# Patient Record
Sex: MALE | Race: WHITE | NOT HISPANIC OR LATINO | Employment: FULL TIME | URBAN - METROPOLITAN AREA
[De-identification: names, ages, dates, MRNs, and addresses within clinical notes are randomized per-mention and may not be internally consistent; named-entity substitution may affect disease eponyms.]

---

## 2018-04-03 ENCOUNTER — OFFICE VISIT (OUTPATIENT)
Dept: FAMILY MEDICINE CLINIC | Facility: CLINIC | Age: 33
End: 2018-04-03
Payer: COMMERCIAL

## 2018-04-03 VITALS
SYSTOLIC BLOOD PRESSURE: 120 MMHG | HEIGHT: 65 IN | BODY MASS INDEX: 28.66 KG/M2 | WEIGHT: 172 LBS | HEART RATE: 52 BPM | DIASTOLIC BLOOD PRESSURE: 72 MMHG

## 2018-04-03 DIAGNOSIS — L23.9 ALLERGIC CONTACT DERMATITIS, UNSPECIFIED TRIGGER: Primary | ICD-10-CM

## 2018-04-03 PROCEDURE — 1036F TOBACCO NON-USER: CPT | Performed by: FAMILY MEDICINE

## 2018-04-03 PROCEDURE — 3008F BODY MASS INDEX DOCD: CPT | Performed by: FAMILY MEDICINE

## 2018-04-03 PROCEDURE — 99203 OFFICE O/P NEW LOW 30 MIN: CPT | Performed by: FAMILY MEDICINE

## 2018-04-03 RX ORDER — PREDNISONE 10 MG/1
TABLET ORAL
Qty: 36 TABLET | Refills: 0 | Status: SHIPPED | OUTPATIENT
Start: 2018-04-03

## 2018-04-03 NOTE — PROGRESS NOTES
Assessment/Plan:    Allergic contact dermatitis  The patient was given a prednisone 10 mg tablets:  Five tablets for 2 days, 4 tablets for 3 days, 3 tablets for 3 days, 2 tablets for 2 days and 1 tablet for 1 day  He is also to use Claritin 10 mg 1 daily and Benadryl 25 mg 2 capsules at bedtime  Diagnoses and all orders for this visit:    Allergic contact dermatitis, unspecified trigger  -     predniSONE 10 mg tablet; Prednisone 10 mg tabs: Five tablets for 2 days, 4 tablets for 3 days, 3 tablets for 3 days, 2 tablets for 2 days, 1 tablet for 1 day  Subjective:      Patient ID: Nelwalan Chacon is a 34 y o  male  CC:  NP   -   Patient has itchy rash B/L forearms, abdomen, thighs that started Saturday  Notes 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 and questions if he was exposed to mold  He is currently using  OTC benadryl, hydrocortisone, and histaminum hydrochloricum   -  lsh    HPI:  This is a 59-year-old male new patient who comes in with a rash on his forearms, abdomen and thighs  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 and had noticed that he had mold exposed in the beam says that he was opening up  This has happened in the past and he has gotten the same type of rash  He has been using over-the-counter Benadryl, hydrocortisone cream and an antihistamine that he got over-the-counter to take during the day  His blood pressure is 120/72 and his weight is 172 lb  The following portions of the patient's history were reviewed and updated as appropriate: allergies, current medications, past family history, past medical history, past social history, past surgical history and problem list     Review of Systems   Constitutional: Negative  HENT: Negative  Eyes: Negative  Respiratory: Negative  Cardiovascular: Negative  Gastrointestinal: Negative  Endocrine: Negative  Genitourinary: Negative  Musculoskeletal: Negative  Skin: Positive for rash  Allergic/Immunologic: Negative  Neurological: Negative  Hematological: Negative  Psychiatric/Behavioral: Negative  Vitals:    04/03/18 0929   BP: 120/72   BP Location: Left arm   Patient Position: Sitting   Cuff Size: Large   Pulse: (!) 52   Weight: 78 kg (172 lb)   Height: 5' 5" (1 651 m)   Objective:      /72 (BP Location: Left arm, Patient Position: Sitting, Cuff Size: Large)   Pulse (!) 52   Ht 5' 5" (1 651 m)   Wt 78 kg (172 lb)   BMI 28 62 kg/m²          Physical Exam   Constitutional: He is oriented to person, place, and time  He appears well-developed and well-nourished  HENT:   Head: Normocephalic  Right Ear: External ear normal    Left Ear: External ear normal    Mouth/Throat: Oropharynx is clear and moist    Eyes: Conjunctivae and EOM are normal  Pupils are equal, round, and reactive to light  Neck: Normal range of motion  Neck supple  Cardiovascular: Normal rate, regular rhythm and normal heart sounds  Pulmonary/Chest: Effort normal and breath sounds normal    Abdominal: Soft  Bowel sounds are normal    Musculoskeletal: Normal range of motion  Neurological: He is alert and oriented to person, place, and time  Skin: Skin is warm  Papular, erythematous rash located on his forearms, abdomen and thighs  Psychiatric: He has a normal mood and affect   His behavior is normal  Judgment and thought content normal

## 2018-04-03 NOTE — ASSESSMENT & PLAN NOTE
The patient was given a prednisone 10 mg tablets:  Five tablets for 2 days, 4 tablets for 3 days, 3 tablets for 3 days, 2 tablets for 2 days and 1 tablet for 1 day  He is also to use Claritin 10 mg 1 daily and Benadryl 25 mg 2 capsules at bedtime

## 2019-10-25 ENCOUNTER — OFFICE VISIT (OUTPATIENT)
Dept: FAMILY MEDICINE CLINIC | Facility: CLINIC | Age: 34
End: 2019-10-25
Payer: COMMERCIAL

## 2019-10-25 VITALS
BODY MASS INDEX: 27.32 KG/M2 | DIASTOLIC BLOOD PRESSURE: 92 MMHG | SYSTOLIC BLOOD PRESSURE: 148 MMHG | HEIGHT: 65 IN | TEMPERATURE: 98.5 F | RESPIRATION RATE: 18 BRPM | HEART RATE: 80 BPM | WEIGHT: 164 LBS
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
## 2019-10-25 DIAGNOSIS — R21 RASH AND NONSPECIFIC SKIN ERUPTION: Primary | ICD-10-CM

## 2019-10-25 PROCEDURE — 3008F BODY MASS INDEX DOCD: CPT | Performed by: FAMILY MEDICINE

## 2019-10-25 PROCEDURE — 99213 OFFICE O/P EST LOW 20 MIN: CPT | Performed by: FAMILY MEDICINE

## 2019-10-25 RX ORDER — DOXYCYCLINE HYCLATE 100 MG
100 TABLET ORAL 2 TIMES DAILY
Qty: 42 TABLET | Refills: 0 | Status: SHIPPED | OUTPATIENT
Start: 2019-10-25 | End: 2019-11-15

## 2019-10-25 NOTE — ASSESSMENT & PLAN NOTE
Patient appears to have an active tick bite  Based on his history, it does seem like this is likely to be a deer tick and transmit Lyme disease for him  Will send doxycycline to the pharmacy for 21 days

## 2019-10-25 NOTE — PATIENT INSTRUCTIONS
Problem List Items Addressed This Visit     Tick bite - Primary    Relevant Medications    doxycycline hyclate (VIBRA-TABS) 100 mg tablet

## 2019-10-25 NOTE — PROGRESS NOTES
Assessment and Plan:    Problem List Items Addressed This Visit     Tick bite     Patient appears to have an active tick bite  Based on his history, it does seem like this is likely to be a deer tick and transmit Lyme disease for him  Will send doxycycline to the pharmacy for 21 days  Relevant Medications    doxycycline hyclate (VIBRA-TABS) 100 mg tablet      Other Visit Diagnoses     Rash and nonspecific skin eruption    -  Primary                 Diagnoses and all orders for this visit:    Rash and nonspecific skin eruption    Tick bite, initial encounter  -     doxycycline hyclate (VIBRA-TABS) 100 mg tablet; Take 1 tablet (100 mg total) by mouth 2 (two) times a day for 21 days              Subjective:      Patient ID: Delvis Haddad is a 27 y o  male  CC:    Chief Complaint   Patient presents with    Tick bite     Patient present today for a deer tick bite  Per patient he noticed it on Sunday  Anjana Scott stated he was diagnosed with Lyme when he was 22 yrs old and he was treated for it  HPI:    Patient recently removed a tick from his body  It was a deer tick per his description, and was imbedded in the skin  He did not use the device to remove the tick  Previously, he had Lyme disease, and wanted to be checked for that today  Reviewed with him about Lyme testing, as well as treatment options  The following portions of the patient's history were reviewed and updated as appropriate: allergies, current medications and problem list       Review of Systems   Constitutional: Negative  HENT: Negative  Skin: Positive for rash  Data to review:       Objective:    Vitals:    10/25/19 1406   BP: 148/92   BP Location: Left arm   Patient Position: Sitting   Cuff Size: Standard   Pulse: 80   Resp: 18   Temp: 98 5 °F (36 9 °C)   TempSrc: Temporal   Weight: 74 4 kg (164 lb)   Height: 5' 5" (1 651 m)        Physical Exam   Constitutional: He appears well-developed and well-nourished  Nursing note and vitals reviewed
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
## 2019-11-14 RX ORDER — DOXYCYCLINE HYCLATE 100 MG
100 TABLET ORAL 2 TIMES DAILY
Qty: 42 TABLET | Refills: 0 | OUTPATIENT
Start: 2019-11-14 | End: 2019-12-05

## 2022-08-16 ENCOUNTER — TELEMEDICINE (OUTPATIENT)
Dept: FAMILY MEDICINE CLINIC | Facility: CLINIC | Age: 37
End: 2022-08-16
Payer: COMMERCIAL

## 2022-08-16 ENCOUNTER — APPOINTMENT (OUTPATIENT)
Dept: RADIOLOGY | Facility: MEDICAL CENTER | Age: 37
End: 2022-08-16
Payer: COMMERCIAL

## 2022-08-16 DIAGNOSIS — R07.89 CHEST TIGHTNESS: ICD-10-CM

## 2022-08-16 DIAGNOSIS — J02.9 SORE THROAT: ICD-10-CM

## 2022-08-16 DIAGNOSIS — R05.9 COUGH: ICD-10-CM

## 2022-08-16 DIAGNOSIS — R50.9 FEVER, UNSPECIFIED FEVER CAUSE: Primary | ICD-10-CM

## 2022-08-16 DIAGNOSIS — R50.9 FEVER, UNSPECIFIED FEVER CAUSE: ICD-10-CM

## 2022-08-16 PROCEDURE — U0003 INFECTIOUS AGENT DETECTION BY NUCLEIC ACID (DNA OR RNA); SEVERE ACUTE RESPIRATORY SYNDROME CORONAVIRUS 2 (SARS-COV-2) (CORONAVIRUS DISEASE [COVID-19]), AMPLIFIED PROBE TECHNIQUE, MAKING USE OF HIGH THROUGHPUT TECHNOLOGIES AS DESCRIBED BY CMS-2020-01-R: HCPCS | Performed by: FAMILY MEDICINE

## 2022-08-16 PROCEDURE — 99442 PR PHYS/QHP TELEPHONE EVALUATION 11-20 MIN: CPT | Performed by: FAMILY MEDICINE

## 2022-08-16 PROCEDURE — U0005 INFEC AGEN DETEC AMPLI PROBE: HCPCS | Performed by: FAMILY MEDICINE

## 2022-08-16 PROCEDURE — 71046 X-RAY EXAM CHEST 2 VIEWS: CPT

## 2022-08-16 RX ORDER — AZITHROMYCIN 500 MG/1
TABLET, FILM COATED ORAL
COMMUNITY
Start: 2022-07-08 | End: 2022-08-18

## 2022-08-16 RX ORDER — DEXTROMETHORPHAN HYDROBROMIDE AND PROMETHAZINE HYDROCHLORIDE 15; 6.25 MG/5ML; MG/5ML
SYRUP ORAL
COMMUNITY
Start: 2022-08-15

## 2022-08-16 RX ORDER — ALBUTEROL SULFATE 90 UG/1
2 AEROSOL, METERED RESPIRATORY (INHALATION) EVERY 6 HOURS PRN
Qty: 8.5 G | Refills: 0 | Status: SHIPPED | OUTPATIENT
Start: 2022-08-16

## 2022-08-16 NOTE — PATIENT INSTRUCTIONS
Finish Zithromax, prednisone, and cough medicine as prescribed by urgent care   Complete chest x-ray  Come to office parking lot call office and we will complete PCR test for COVID   Use albuterol 2 puffs every 6 hours as needed for chest tightness cough or congestion  Recheck 2 days in office unless COVID positive

## 2022-08-16 NOTE — PROGRESS NOTES
COVID-19 Outpatient Progress Note    Assessment/Plan:    1  Fever  2  Chest tightness  3  Cough  4  Sore throat  Per patient urgent care did a rapid strep which was negative   Patient did 3 home tests since Saturday which were negative for COVID  Patient is continue Zithromax, prednisone, and cough medicine as prescribed by urgent care  Patient is come to office for COVID PCR test  Chest x-rays ordered  Albuterol inhaler is ordered  5  Recheck 48 hours in office unless COVID positive      Problem List Items Addressed This Visit    None     Visit Diagnoses     Fever, unspecified fever cause    -  Primary    Relevant Orders    COVID Only - Office Collect    XR chest pa & lateral    Chest tightness        Relevant Medications    albuterol (PROVENTIL HFA,VENTOLIN HFA) 90 mcg/act inhaler    Other Relevant Orders    COVID Only - Office Collect    XR chest pa & lateral    Cough        Relevant Orders    COVID Only - Office Collect    XR chest pa & lateral    Sore throat        Relevant Orders    XR chest pa & lateral         Disposition:     Referred patient to centralized site to test for COVID-19  Chest x-ray is ordered, albuterol is ordered  Patient should finish Zithromax, prednisone and use cough medicine as prescribed by urgent care  Patient to come to office to get PCR for COVID  Will recheck 2 days in office    I have spent 15 minutes directly with the patient  Encounter provider Benjamin Dooley DO    Provider located at [address] [address] [phone]    Recent Visits  No visits were found meeting these conditions    Showing recent visits within past 7 days and meeting all other requirements  Today's Visits  Date Type Provider Dept   08/16/22 Telemedicine Benjamin Dooley DO [facility]   Showing today's visits and meeting all other requirements  Future Appointments  No visits were found meeting these conditions  Showing future appointments within next 150 days and meeting all other requirements       The patient was identified by name and date of birth  Otoniel Prescott was informed that this is a telemedicine visit and that the visit is being conducted through Telephone  My office door was closed  No one else was in the room  He acknowledged consent and understanding of privacy and security of the video platform  The patient has agreed to participate and understands they can discontinue the visit at any time  Patient is aware this is a billable service  This virtual check-in was done via telephone and he agrees to proceed  Patient agrees to participate in a virtual check in via telephone or video visit instead of presenting to the office to address urgent/immediate medical needs  Patient is aware this is a billable service  After connecting through Telephone, the patient was identified by name and date of birth  Otoniel Prescott was informed that this was a telemedicine visit and that the exam was being conducted confidentially over secure lines  My office door was closed  No one else was in the room  Otoniel Prescott acknowledged consent and understanding of privacy and security of the telemedicine visit  I informed the patient that I have reviewed his record in Epic and presented the opportunity for him to ask any questions regarding the visit today  The patient agreed to participate  It was my intent to perform this visit via video technology but the patient was not able to do a video connection so the visit was completed via audio telephone only  Verification of patient location:  Patient is located in the following state in which I hold an active license: [place]    Subjective:   Otoniel Prescott is a 39 y o  male who is concerned about COVID-19  Patient's symptoms include fever, chills, sore throat, cough and chest tightness   Patient denies fatigue, malaise, congestion, rhinorrhea, anosmia, loss of taste, shortness of breath, abdominal pain, nausea, vomiting, diarrhea, myalgias and headaches  - Date of symptom onset: 8/13/2022      COVID-19 vaccination status: Not vaccinated    Exposure:   Contact with a person who is under investigation (PUI) for or who is positive for COVID-19 within the last 14 days?: No    Hospitalized recently for fever and/or lower respiratory symptoms?: No      Currently a healthcare worker that is involved in direct patient care?: No      Works in a special setting where the risk of COVID-19 transmission may be high? (this may include long-term care, correctional and halfway facilities; homeless shelters; assisted-living facilities and group homes ): No      Resident in a special setting where the risk of COVID-19 transmission may be high? (this may include long-term care, correctional and halfway facilities; homeless shelters; assisted-living facilities and group homes ): No      Patient was in [facility] for 10 days came back last Wednesday  On Saturday patient was out my in biking and felt some chest tightness has had low-grade fever off and on since that time  Had a sore throat  Went to urgent care yesterday received Z-Kg, prednisone and cough medicine  Patient did 3 home test which were negative for COVID  Urgent care did a strep test which was negative yesterday  No results found for: Rioschinyere Almonte, [address] [facility] [address], [place] [address], [facility], [address] [facility]  Past Medical History:   Diagnosis Date    Mass     Cyst on right shoulder     History reviewed  No pertinent surgical history    Current Outpatient Medications   Medication Sig Dispense Refill    albuterol (PROVENTIL HFA,VENTOLIN HFA) 90 mcg/act inhaler Inhale 2 puffs every 6 (six) hours as needed (Chest tightness) 8 5 g 0    azithromycin (ZITHROMAX) 500 MG tablet TAKE 2 TABLETS BY MOUTH ONCE      promethazine-dextromethorphan (PHENERGAN-DM) 6 25-15 mg/5 mL oral syrup        No current facility-administered medications for this visit  Allergies   Allergen Reactions    Clove [Syzygium Aromaticum] Vomiting       Review of Systems   Constitutional: Positive for chills and fever  Negative for fatigue  HENT: Positive for sore throat  Negative for congestion and rhinorrhea  Respiratory: Positive for cough and chest tightness  Negative for shortness of breath  Gastrointestinal: Negative for abdominal pain, diarrhea, nausea and vomiting  Musculoskeletal: Negative for myalgias  Neurological: Negative for headaches  Objective: There were no vitals filed for this visit      Physical Exam  Constitutional:       Comments: Phone call, no exam

## 2022-08-17 LAB — SARS-COV-2 RNA RESP QL NAA+PROBE: NEGATIVE

## 2022-08-18 ENCOUNTER — OFFICE VISIT (OUTPATIENT)
Dept: FAMILY MEDICINE CLINIC | Facility: CLINIC | Age: 37
End: 2022-08-18
Payer: COMMERCIAL

## 2022-08-18 VITALS
TEMPERATURE: 97.6 F | OXYGEN SATURATION: 99 % | WEIGHT: 163.38 LBS | HEIGHT: 66 IN | BODY MASS INDEX: 26.26 KG/M2 | SYSTOLIC BLOOD PRESSURE: 112 MMHG | DIASTOLIC BLOOD PRESSURE: 72 MMHG | HEART RATE: 74 BPM

## 2022-08-18 DIAGNOSIS — R09.89 CHEST CONGESTION: ICD-10-CM

## 2022-08-18 DIAGNOSIS — Z11.59 NEED FOR HEPATITIS C SCREENING TEST: ICD-10-CM

## 2022-08-18 DIAGNOSIS — Z00.00 HEALTH CARE MAINTENANCE: ICD-10-CM

## 2022-08-18 DIAGNOSIS — Z11.4 SCREENING FOR HIV (HUMAN IMMUNODEFICIENCY VIRUS): ICD-10-CM

## 2022-08-18 DIAGNOSIS — J30.9 ALLERGIC RHINITIS, UNSPECIFIED SEASONALITY, UNSPECIFIED TRIGGER: ICD-10-CM

## 2022-08-18 DIAGNOSIS — R05.9 COUGH: Primary | ICD-10-CM

## 2022-08-18 DIAGNOSIS — J20.9 ACUTE BRONCHITIS, UNSPECIFIED ORGANISM: ICD-10-CM

## 2022-08-18 DIAGNOSIS — Z13.220 ENCOUNTER FOR SCREENING FOR LIPID DISORDER: ICD-10-CM

## 2022-08-18 PROBLEM — L23.9 ALLERGIC CONTACT DERMATITIS: Status: RESOLVED | Noted: 2018-04-03 | Resolved: 2022-08-18
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
## 2022-08-18 PROCEDURE — 99214 OFFICE O/P EST MOD 30 MIN: CPT | Performed by: FAMILY MEDICINE

## 2022-08-18 RX ORDER — FLUTICASONE PROPIONATE 50 MCG
2 SPRAY, SUSPENSION (ML) NASAL DAILY
COMMUNITY

## 2022-08-18 RX ORDER — BENZONATATE 200 MG/1
200 CAPSULE ORAL 3 TIMES DAILY PRN
Qty: 30 CAPSULE | Refills: 1 | Status: SHIPPED | OUTPATIENT
Start: 2022-08-18 | End: 2022-08-28

## 2022-08-18 RX ORDER — AZITHROMYCIN 250 MG/1
TABLET, FILM COATED ORAL
COMMUNITY
Start: 2022-08-15 | End: 2022-08-18

## 2022-08-18 NOTE — PATIENT INSTRUCTIONS
I recommend patient use albuterol inhaler 2 puffs every 6 hours as needed for chest congestion or cough   Patient use Tessalon every 8 hours as needed for cough   I recommend Flonase nasal spray 2 sprays each nostril once daily for next month  Patient use over-the-counter Mucinex for chest congestion  If not significantly better in 1-2 weeks and not fully resolved in 3-4 weeks please return to office  Return in 3 months for well exam for office visit and blood work sooner if need

## 2022-08-18 NOTE — PROGRESS NOTES
Assessment and Plan:  1  Cough  2  Chest congestion   3  Acute bronchitis   Patient's finish with Zithromax  Patient's finish with promethazine  I recommend patient pickup albuterol to help chest congestion 2 puffs every 6 hours   Patient use over-the-counter Mucinex  Increase p o  fluids   Tessalon was ordered  Chest x-ray was clear/COVID was negative  Return in 2 weeks if not significantly better  4  Per allergic rhinitis, recommend patient use Flonase nasal spray 2 sprays each nostril once daily for next month  5  Healthcare maintenance, discussed routine blood work to include lipid, hepatitis-C, HIV, orders placed  6   Patient return in 3 months for well exam, sooner if need    Problem List Items Addressed This Visit        Respiratory    Allergic rhinitis     I recommend Flonase daily         Relevant Orders    CBC    Comprehensive metabolic panel    Lipid Panel with Direct LDL reflex    UA (URINE) with reflex to Scope       Other    Health care maintenance     Schedule a well exam   Blood work is ordered to include hepatitis-C and HIV patient aware         Relevant Orders    CBC    Comprehensive metabolic panel    Lipid Panel with Direct LDL reflex    UA (URINE) with reflex to Scope      Other Visit Diagnoses     Cough    -  Primary    Relevant Medications    benzonatate (TESSALON) 200 MG capsule    Other Relevant Orders    CBC    Comprehensive metabolic panel    Lipid Panel with Direct LDL reflex    UA (URINE) with reflex to Scope    Chest congestion        Relevant Medications    fluticasone (FLONASE) 50 mcg/act nasal spray    benzonatate (TESSALON) 200 MG capsule    Other Relevant Orders    CBC    Comprehensive metabolic panel    Lipid Panel with Direct LDL reflex    UA (URINE) with reflex to Scope    Acute bronchitis, unspecified organism        Relevant Medications    fluticasone (FLONASE) 50 mcg/act nasal spray    benzonatate (TESSALON) 200 MG capsule    Other Relevant Orders    CBC    Comprehensive metabolic panel    Lipid Panel with Direct LDL reflex    UA (URINE) with reflex to Scope    Encounter for screening for lipid disorder        Relevant Orders    CBC    Comprehensive metabolic panel    Lipid Panel with Direct LDL reflex    UA (URINE) with reflex to Scope    Need for hepatitis C screening test        Relevant Orders    Hepatitis C Antibody ([facility])    CBC    Comprehensive metabolic panel    Lipid Panel with Direct LDL reflex    UA (URINE) with reflex to Scope    Screening for HIV (human immunodeficiency virus)        Relevant Orders    HIV 1/2 Antigen/Antibody (4th Generation) w Reflex [facility]    CBC    Comprehensive metabolic panel    Lipid Panel with Direct LDL reflex    UA (URINE) with reflex to Scope                 Diagnoses and all orders for this visit:    Cough  -     benzonatate (TESSALON) 200 MG capsule; Take 1 capsule (200 mg total) by mouth 3 (three) times a day as needed for cough for up to 10 days  -     CBC; Future  -     Comprehensive metabolic panel; Future  -     Lipid Panel with Direct LDL reflex; Future  -     UA (URINE) with reflex to Scope; Future  -     CBC  -     Comprehensive metabolic panel  -     Lipid Panel with Direct LDL reflex  -     UA (URINE) with reflex to Scope    Chest congestion  -     benzonatate (TESSALON) 200 MG capsule; Take 1 capsule (200 mg total) by mouth 3 (three) times a day as needed for cough for up to 10 days  -     CBC; Future  -     Comprehensive metabolic panel; Future  -     Lipid Panel with Direct LDL reflex; Future  -     UA (URINE) with reflex to Scope; Future  -     CBC  -     Comprehensive metabolic panel  -     Lipid Panel with Direct LDL reflex  -     UA (URINE) with reflex to Scope    Acute bronchitis, unspecified organism  -     benzonatate (TESSALON) 200 MG capsule; Take 1 capsule (200 mg total) by mouth 3 (three) times a day as needed for cough for up to 10 days  -     CBC; Future  -     Comprehensive metabolic panel;  Future  - Lipid Panel with Direct LDL reflex; Future  -     UA (URINE) with reflex to Scope; Future  -     CBC  -     Comprehensive metabolic panel  -     Lipid Panel with Direct LDL reflex  -     UA (URINE) with reflex to Scope    Allergic rhinitis, unspecified seasonality, unspecified trigger  -     CBC; Future  -     Comprehensive metabolic panel; Future  -     Lipid Panel with Direct LDL reflex; Future  -     UA (URINE) with reflex to Scope; Future  -     CBC  -     Comprehensive metabolic panel  -     Lipid Panel with Direct LDL reflex  -     UA (URINE) with reflex to Scope    Health care maintenance  -     CBC; Future  -     Comprehensive metabolic panel; Future  -     Lipid Panel with Direct LDL reflex; Future  -     UA (URINE) with reflex to Scope; Future  -     CBC  -     Comprehensive metabolic panel  -     Lipid Panel with Direct LDL reflex  -     UA (URINE) with reflex to Scope    Encounter for screening for lipid disorder  -     CBC; Future  -     Comprehensive metabolic panel; Future  -     Lipid Panel with Direct LDL reflex; Future  -     UA (URINE) with reflex to Scope; Future  -     CBC  -     Comprehensive metabolic panel  -     Lipid Panel with Direct LDL reflex  -     UA (URINE) with reflex to Scope    Need for hepatitis C screening test  -     Hepatitis C Antibody ([facility]); Future  -     CBC; Future  -     Comprehensive metabolic panel; Future  -     Lipid Panel with Direct LDL reflex; Future  -     UA (URINE) with reflex to Scope; Future  -     Hepatitis C Antibody ([facility])  -     CBC  -     Comprehensive metabolic panel  -     Lipid Panel with Direct LDL reflex  -     UA (URINE) with reflex to Scope    Screening for HIV (human immunodeficiency virus)  -     HIV 1/2 Antigen/Antibody (4th Generation) w Reflex [facility]; Future  -     CBC; Future  -     Comprehensive metabolic panel; Future  -     Lipid Panel with Direct LDL reflex; Future  -     UA (URINE) with reflex to Scope;  Future  - CBC  -     Comprehensive metabolic panel  -     Lipid Panel with Direct LDL reflex  -     UA (URINE) with reflex to Scope    Other orders  -     fluticasone (FLONASE) 50 mcg/act nasal spray; 2 sprays into each nostril daily            Subjective:      Patient ID: Ade Woo is a 39 y o  male  CC:    Chief Complaint   Patient presents with    Follow-up     Pt is still having a lot of chest congestion and cough  mgb       HPI:    PCR COVID test was negative, chest x-ray is clear  Patient does have some chest congestion and cough left over mild head congestion and clear rhinorrhea  No fever chills no wheezing shortness of breath  Cough is dry in nature  The following portions of the patient's history were reviewed and updated as appropriate: allergies, current medications, past family history, past medical history, past social history, past surgical history and problem list       Review of Systems   Constitutional:        HPI   HENT:        HPI   Eyes: Negative  Respiratory:        HPI   Cardiovascular: Negative  Gastrointestinal: Negative  Endocrine: Negative  Genitourinary: Negative  Musculoskeletal: Negative  Skin: Negative  Allergic/Immunologic: Positive for environmental allergies  Neurological: Negative  Hematological: Negative  Psychiatric/Behavioral: Negative  Data to review:       Objective:    Vitals:    08/18/22 1322   BP: 112/72   BP Location: Right arm   Patient Position: Sitting   Cuff Size: Standard   Pulse: 74   Temp: 97 6 °F (36 4 °C)   TempSrc: Temporal   SpO2: 99%   Weight: 74 1 kg (163 lb 6 oz)   Height: 5' 6" (1 676 m)        Physical Exam  Vitals and nursing note reviewed  Constitutional:       Appearance: Normal appearance  HENT:      Head: Normocephalic and atraumatic        Right Ear: Tympanic membrane normal       Left Ear: Tympanic membrane normal       Nose:      Comments: Mildly allergic turbinates     Mouth/Throat:      Comments: Scant clear postnasal drip negative pharyngeal injection or exudate  Eyes:      General: No scleral icterus  Right eye: No discharge  Left eye: No discharge  Extraocular Movements: Extraocular movements intact  Conjunctiva/sclera: Conjunctivae normal       Pupils: Pupils are equal, round, and reactive to light  Cardiovascular:      Rate and Rhythm: Normal rate and regular rhythm  Heart sounds: Normal heart sounds  Pulmonary:      Effort: Pulmonary effort is normal       Breath sounds: Normal breath sounds  Musculoskeletal:      Cervical back: Neck supple  Lymphadenopathy:      Cervical: No cervical adenopathy  Skin:     General: Skin is warm and dry  Neurological:      General: No focal deficit present     Psychiatric:         Mood and Affect: Mood normal

## 2022-10-17 PROBLEM — Z00.00 HEALTH CARE MAINTENANCE: Status: RESOLVED | Noted: 2022-08-18 | Resolved: 2022-10-17

## 2023-05-11 ENCOUNTER — APPOINTMENT (OUTPATIENT)
Dept: PHYSICAL THERAPY | Facility: CLINIC | Age: 38
End: 2023-05-11

## 2024-02-16 ENCOUNTER — OFFICE VISIT (OUTPATIENT)
Dept: UROLOGY | Facility: CLINIC | Age: 39
End: 2024-02-16
Payer: COMMERCIAL

## 2024-02-16 ENCOUNTER — APPOINTMENT (OUTPATIENT)
Dept: LAB | Facility: MEDICAL CENTER | Age: 39
End: 2024-02-16
Payer: COMMERCIAL

## 2024-02-16 VITALS
OXYGEN SATURATION: 97 % | HEART RATE: 54 BPM | WEIGHT: 168 LBS | SYSTOLIC BLOOD PRESSURE: 112 MMHG | DIASTOLIC BLOOD PRESSURE: 70 MMHG | HEIGHT: 66 IN | BODY MASS INDEX: 27 KG/M2

## 2024-02-16 DIAGNOSIS — R30.0 DYSURIA: Primary | ICD-10-CM

## 2024-02-16 DIAGNOSIS — R30.0 DYSURIA: ICD-10-CM

## 2024-02-16 PROCEDURE — 99203 OFFICE O/P NEW LOW 30 MIN: CPT

## 2024-02-16 PROCEDURE — 87109 MYCOPLASMA: CPT

## 2024-02-16 NOTE — PROGRESS NOTES
Office Visit- Urology  Zheng Garzon 1985 MRN: [id number]      Assessment/Discussion/Plan    38 y.o. male managed by     Penile pain  -Previous history of chlamydia/gonorrhea infection.  Patient now with sense of dysuria, postvoid dribbling, less force to urinary stream.    -No evidence of any urethral meatus stricture on exam today  -I think that may be beneficial to have cystoscopy to rule out stricture given patient's previous history of STI and now with his urinary symptoms.  Will obtain a ultrasound of the kidneys and bladder to evaluate the upper urinary tract  -Return for cystoscopy    Chief Complaint:   Zheng is a 38 y.o. male presenting to the office for an initial evaluation regarding penile pain        Subjective    Patient is a 38-year-old male with no previous urologic history who presents to the office today for evaluation of burning sensation within the urethra that has been ongoing since December.  He notes that when he was urinating he had 1 episode where he felt an acute sharp pain which later subsided.  Subsequent to this after ejaculation patient has experienced painful urination.  He denies any deviation or spraying of the urinary stream or difficulty with emptying bladder but does note that he has a slightly weaker urinary stream.  He was evaluated at an urgent care where a urine culture, gonorrhea, chlamydia, and trichomonas testing was obtained and all returned negative.  There is no evidence of any microscopic hematuria.  Patient denies any occurrence of gross hematuria.  He has no known history of nephrolithiasis.  He denies any flank pain at any point.  He states that he does not low degree of abdominal pain in the lower quadrants.  Sometimes scrotal discomfort. family history of nephrolithiasis in his grandfather.  Patient previously had gonorrhea/committee a as well as trichomonas infections.  He denies any penile trauma or previous radiation exposure to the pelvic area.  He  denies any rectal pain or perineal pain to warrant consideration of acute prostatitis.  He denies frequency       ROS:   Review of Systems   Constitutional: Negative.  Negative for chills, fatigue and fever.   HENT: Negative.     Respiratory:  Negative for shortness of breath.    Cardiovascular:  Negative for chest pain.   Gastrointestinal: Negative.  Negative for abdominal pain.   Endocrine: Negative.    Musculoskeletal: Negative.    Skin: Negative.    Neurological: Negative.  Negative for dizziness and light-headedness.   Hematological: Negative.    Psychiatric/Behavioral: Negative.           Past Medical History  Past Medical History:   Diagnosis Date    Mass     Cyst on right shoulder       Past Surgical History  History reviewed. No pertinent surgical history.    Past Family History  Family History   Problem Relation Age of Onset    Arthritis Father     Cancer Maternal Grandfather     Cancer Paternal Grandmother     Cancer Paternal Grandfather        Past Social history  Social History     Socioeconomic History    Marital status: Single     Spouse name: Not on file    Number of children: Not on file    Years of education: Not on file    Highest education level: Not on file   Occupational History    Not on file   Tobacco Use    Smoking status: Never    Smokeless tobacco: Never   Vaping Use    Vaping status: Never Used   Substance and Sexual Activity    Alcohol use: Yes     Comment: occasional    Drug use: Yes     Types: Marijuana    Sexual activity: Yes   Other Topics Concern    Not on file   Social History Narrative    Not on file     Social Determinants of Health     Financial Resource Strain: Not on file   Food Insecurity: Not on file   Transportation Needs: Not on file   Physical Activity: Not on file   Stress: Not on file   Social Connections: Not on file   Intimate Partner Violence: Not on file   Housing Stability: Not on file       Current Medications  Current Outpatient Medications   Medication Sig  "Dispense Refill    albuterol (PROVENTIL HFA,VENTOLIN HFA) 90 mcg/act inhaler Inhale 2 puffs every 6 (six) hours as needed (Chest tightness) (Patient not taking: Reported on 8/18/2022) 8.5 g 0    fluticasone (FLONASE) 50 mcg/act nasal spray 2 sprays into each nostril daily (Patient not taking: Reported on 2/16/2024)      promethazine-dextromethorphan (PHENERGAN-DM) 6.25-15 mg/5 mL oral syrup  (Patient not taking: Reported on 8/18/2022)       No current facility-administered medications for this visit.       Allergies  Allergies   Allergen Reactions    Clove [Cloves (Syzygium Aromaticum)] Vomiting       OBJECTIVE    Vitals   Vitals:    02/16/24 1120   BP: 112/70   BP Location: Left arm   Patient Position: Sitting   Cuff Size: Adult   Pulse: (!) 54   SpO2: 97%   Weight: 76.2 kg (168 lb)   Height: 5' 6\" (1.676 m)       PVR:    Physical Exam  Constitutional:       General: He is not in acute distress.     Appearance: Normal appearance. He is normal weight. He is not ill-appearing or toxic-appearing.   HENT:      Head: Normocephalic and atraumatic.   Eyes:      Conjunctiva/sclera: Conjunctivae normal.   Cardiovascular:      Rate and Rhythm: Normal rate.      Pulses: Normal pulses.   Pulmonary:      Effort: Pulmonary effort is normal. No respiratory distress.   Abdominal:      Tenderness: There is no right CVA tenderness or left CVA tenderness.   Genitourinary:     Penis: Normal.       Testes: Normal.      Comments: On penile exam no evidence of any lesions or erythema on the glans.  Circumcised phallus.  Urethral meatus is orthotopic and patent.    No testicular masses appreciated on examination  Musculoskeletal:         General: Normal range of motion.      Cervical back: Normal range of motion and neck supple.   Skin:     General: Skin is warm and dry.   Neurological:      General: No focal deficit present.      Mental Status: He is alert and oriented to person, place, and time.      Cranial Nerves: No cranial nerve " "deficit.   Psychiatric:         Mood and Affect: Mood normal.         Behavior: Behavior normal.         Thought Content: Thought content normal.       Labs:   LASTLAB(PROTEIN UA,TP,[id number],PROT,PROTEIN UA,PROTEINUA,PROTUR,LABPROTURI,PROTEIN,URPROTEIN)@   No results found for: \"PSA\"  No results found for: \"CREATININE\"   No results found for: \"HGBA1C\"  No results found for: \"GLUCOSE\", \"CALCIUM\", \"NA\", \"K\", \"CO2\", \"CL\", \"BUN\", \"CREATININE\"    I have personally reviewed all pertinent lab results and reviewed with patient    Imaging       John Johansen PA-C  Date: 2/16/2024 Time: 11:32 AM  [facility]    This note was written using fluency dictation software. Please excuse any resulting minor grammatical errors.      "

## 2024-02-21 ENCOUNTER — TELEPHONE (OUTPATIENT)
Dept: OTHER | Facility: OTHER | Age: 39
End: 2024-02-21

## 2024-02-21 DIAGNOSIS — R10.2 PELVIC AND PERINEAL PAIN: Primary | ICD-10-CM

## 2024-02-21 NOTE — TELEPHONE ENCOUNTER
Pt would like to relay new symptoms he is having which he feels may be a sign of prostatitis. Pt states when defecating he is having pressure around his rectum which he wasn't having before and would like to know if there is any testing that could be added to his upcoming testing.

## 2024-02-22 LAB
M HOMINIS SPEC QL CULT: NEGATIVE
U UREALYTICUM SPEC QL CULT: NEGATIVE

## 2024-02-28 ENCOUNTER — APPOINTMENT (OUTPATIENT)
Dept: LAB | Facility: MEDICAL CENTER | Age: 39
End: 2024-02-28
Payer: COMMERCIAL

## 2024-02-28 DIAGNOSIS — R10.2 PELVIC AND PERINEAL PAIN: ICD-10-CM

## 2024-02-28 PROCEDURE — 87086 URINE CULTURE/COLONY COUNT: CPT

## 2024-02-29 LAB — BACTERIA UR CULT: NORMAL

## 2024-03-05 ENCOUNTER — HOSPITAL ENCOUNTER (OUTPATIENT)
Dept: ULTRASOUND IMAGING | Facility: MEDICAL CENTER | Age: 39
Discharge: HOME | End: 2024-03-05
Payer: COMMERCIAL

## 2024-03-05 DIAGNOSIS — R30.0 DYSURIA: ICD-10-CM

## 2024-03-05 PROCEDURE — 76775 US EXAM ABDO BACK WALL LIM: CPT

## 2024-03-07 ENCOUNTER — NURSE TRIAGE (OUTPATIENT)
Age: 39
End: 2024-03-07

## 2024-03-07 DIAGNOSIS — R30.0 DYSURIA: Primary | ICD-10-CM

## 2024-03-07 NOTE — TELEPHONE ENCOUNTER
Patient called to review results of urine culture. Culture not read. Patient states his symptoms continue, dribbling urine, low grade intermittent fevers, feels rectal pressure. Would like to discuss the plan. US done 3/5/24 is not resulted yet. Reviewed ED Precautions.     # [phone]

## 2024-03-08 NOTE — TELEPHONE ENCOUNTER
"Patient returned call. States he hasn't checked his temp, so he isn't sure if it's been over 100.4, but he's been \"running hot\". States he feels lethargic and has same pressure he reported yesterday. Reviewed ED Precautions.     # [phone]  "

## 2024-03-08 NOTE — TELEPHONE ENCOUNTER
Please call patient to inform that urine culture was negative.  Please clarify whether patient is having a fever above 100.4 degrees.  Ultrasound is still pending we will call patient with results once they arrive.  Patient still needs to proceed with cystoscopy as currently scheduled.  Patient is having true fever would consider empiric antibiotics for possible prostatitis

## 2024-03-10 RX ORDER — SULFAMETHOXAZOLE AND TRIMETHOPRIM 800; 160 MG/1; MG/1
1 TABLET ORAL 2 TIMES DAILY
Qty: 20 TABLET | Refills: 0 | Status: SHIPPED | OUTPATIENT
Start: 2024-03-10 | End: 2024-03-20

## 2024-03-10 NOTE — TELEPHONE ENCOUNTER
US finalized. normal - specifically no stones no retention and no prostate abscess    empiric bactrim sent based on reported LUTS and fevers for prostatitis

## 2024-03-11 NOTE — TELEPHONE ENCOUNTER
Called and left detailed VM for patient with renal US results. Advised that Bactrim was sent to [facility] for him to take twice a day for 10 days due to his symptoms. Plan to keep April appointment as scheduled. Asked patient to return call with questions or concerns.

## 2024-03-12 ENCOUNTER — TELEPHONE (OUTPATIENT)
Dept: UROLOGY | Facility: CLINIC | Age: 39
End: 2024-03-12

## 2024-03-12 NOTE — TELEPHONE ENCOUNTER
----- Message from John Johansen PA-C sent at 3/12/2024 12:57 PM EDT -----  Please call patient to inform him that his ultrasound does not reveal any concerning renal abnormalities.  His prostate does measure somewhat enlarged for his age at 37 g.  I do not think that this is the cause for his discomfort at this point in time. His other testing returned negative.  He can follow up as previously discussed

## 2024-04-05 ENCOUNTER — PROCEDURE VISIT (OUTPATIENT)
Dept: UROLOGY | Facility: CLINIC | Age: 39
End: 2024-04-05
Payer: COMMERCIAL

## 2024-04-05 VITALS
HEIGHT: 66 IN | HEART RATE: 60 BPM | BODY MASS INDEX: 26.68 KG/M2 | OXYGEN SATURATION: 99 % | DIASTOLIC BLOOD PRESSURE: 76 MMHG | SYSTOLIC BLOOD PRESSURE: 120 MMHG | WEIGHT: 166 LBS

## 2024-04-05 DIAGNOSIS — R30.0 DYSURIA: Primary | ICD-10-CM

## 2024-04-05 DIAGNOSIS — M62.89 HIGH-TONE PELVIC FLOOR DYSFUNCTION: ICD-10-CM

## 2024-04-05 PROCEDURE — 52000 CYSTOURETHROSCOPY: CPT | Performed by: UROLOGY

## 2024-04-05 PROCEDURE — 99214 OFFICE O/P EST MOD 30 MIN: CPT | Performed by: UROLOGY

## 2024-04-05 RX ORDER — GABAPENTIN 300 MG/1
300 CAPSULE ORAL
Qty: 30 CAPSULE | Refills: 0 | Status: SHIPPED | OUTPATIENT
Start: 2024-04-05

## 2024-04-05 NOTE — PROGRESS NOTES
ASSESSMENT:     38 y.o. male  with penile pain    PLAN:     I suspect the patient has pelvic floor dysfunction, high tone.  Remote history of L5-S1 trauma.  Current discomfort has been exacerbated by stress.  Patient is very physically active but does note some decreased flexibility specifically in his hip flexors.  He has a weak urinary stream that may be a sign of poor pelvic floor relaxation and has had pain with ejaculation and radiating pain into his testicles and scrotum.    No abnormalities noted on cystoscopy today.  Patient had a normal ultrasound and a infectious workup that was negative.    Recommend a trial of 30 days of gabapentin 300 mg at night to reduce neuropathic pain.  Strongly recommend referral to pelvic floor physical therapy for evaluation and workup.    Patient return in 3 to 4 months to assess improvement or changes.        ----          UROLOGY PROCEDURE NOTE     CHIEF COMPLAINT   Zheng Garzon is a 38 y.o. male with a complaint of No chief complaint on file.      History of Present Illness:   Zheng Garzon is a 38 y.o. male here for evaluation of penile pain.  Patient began having issues with burning and discomfort in December.  Patient has no spraying of his urinary stream or gross hematuria.  No history of stone disease.  Patient had negative infectious testing.  Was offered rectal exam and cystoscopy and presents for this today.    Patient notes the symptoms began after questionable sexual encounter.  Had a panel of STI testing that was negative.  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  Has had issues with hip flexor mobility for some time.  No issues with his bowels.  Occasionally has some discomfort in the bilateral groin or testicles.  Reports remote L5-S1 injury.  No sciatica.      Past Medical History:     Past Medical History:   Diagnosis Date    Mass     Cyst on right shoulder       PAST SURGICAL HISTORY:   No past surgical history on file.    CURRENT  MEDICATIONS:     Current Outpatient Medications   Medication Sig Dispense Refill    albuterol (PROVENTIL HFA,VENTOLIN HFA) 90 mcg/act inhaler Inhale 2 puffs every 6 (six) hours as needed (Chest tightness) (Patient not taking: Reported on 8/18/2022) 8.5 g 0    fluticasone (FLONASE) 50 mcg/act nasal spray 2 sprays into each nostril daily (Patient not taking: Reported on 2/16/2024)      promethazine-dextromethorphan (PHENERGAN-DM) 6.25-15 mg/5 mL oral syrup  (Patient not taking: Reported on 8/18/2022)       No current facility-administered medications for this visit.       ALLERGIES:     Allergies   Allergen Reactions    Clove [Cloves (Syzygium Aromaticum)] Vomiting       SOCIAL HISTORY:     Social History     Socioeconomic History    Marital status: Single     Spouse name: Not on file    Number of children: Not on file    Years of education: Not on file    Highest education level: Not on file   Occupational History    Not on file   Tobacco Use    Smoking status: Never    Smokeless tobacco: Never   Vaping Use    Vaping status: Never Used   Substance and Sexual Activity    Alcohol use: Yes     Comment: occasional    Drug use: Yes     Types: Marijuana    Sexual activity: Yes   Other Topics Concern    Not on file   Social History Narrative    Not on file     Social Determinants of Health     Financial Resource Strain: Not on file   Food Insecurity: Not on file   Transportation Needs: Not on file   Physical Activity: Not on file   Stress: Not on file   Social Connections: Not on file   Intimate Partner Violence: Not on file   Housing Stability: Not on file       SOCIAL HISTORY:     Family History   Problem Relation Age of Onset    Arthritis Father     Cancer Maternal Grandfather     Cancer Paternal Grandmother     Cancer Paternal Grandfather        REVIEW OF SYSTEMS:     Review of Systems   Constitutional:  Negative for chills and fever.   HENT:  Negative for ear pain and sore throat.    Eyes:  Negative for pain and visual  "disturbance.   Respiratory:  Negative for cough and shortness of breath.    Cardiovascular:  Negative for chest pain and palpitations.   Gastrointestinal:  Negative for abdominal pain and vomiting.   Genitourinary:  Negative for dysuria and hematuria.   Musculoskeletal:  Negative for arthralgias and back pain.   Skin:  Negative for color change and rash.   Neurological:  Negative for seizures and syncope.   All other systems reviewed and are negative.        PHYSICAL EXAM:     There were no vitals taken for this visit.    Physical Exam  Vitals reviewed.   Constitutional:       General: He is not in acute distress.     Appearance: He is well-developed.   HENT:      Head: Normocephalic and atraumatic.   Eyes:      Pupils: Pupils are equal, round, and reactive to light.   Cardiovascular:      Rate and Rhythm: Normal rate.   Pulmonary:      Effort: Pulmonary effort is normal. No respiratory distress.      Breath sounds: Normal breath sounds.   Abdominal:      General: There is no distension.      Palpations: Abdomen is soft.      Tenderness: There is no abdominal tenderness.   Genitourinary:     Penis: Normal.       Testes: Normal.      Prostate: Normal.      Comments: Digital rectal exam normal, smooth without nodules, rectal tone is normal without trigger points noted or specific point tenderness in the pelvic floor perirectal muscles  Musculoskeletal:         General: Normal range of motion.      Cervical back: Normal range of motion and neck supple.   Skin:     General: Skin is warm and dry.   Neurological:      Mental Status: He is alert and oriented to person, place, and time.   Psychiatric:         Behavior: Behavior normal.         LABS:     CBC: No results found for: \"WBC\", \"HGB\", \"HCT\", \"MCV\", \"PLT\"    BMP: No results found for: \"GLUCOSE\", \"CALCIUM\", \"NA\", \"K\", \"CO2\", \"CL\", \"BUN\", \"CREATININE\"      IMAGING:     3/5/24  RENAL ULTRASOUND     INDICATION: R30.0: Dysuria.     COMPARISON: None     TECHNIQUE: " Ultrasound of the retroperitoneum was performed with a curvilinear transducer utilizing volumetric sweeps and still imaging techniques.     FINDINGS:     KIDNEYS:  Symmetric and normal size.  Right kidney: 10.4 x 4.8 x 4.4 cm. Volume 116.0 mL  Left kidney: 10.0 x 5.2 x 4.9 cm. Volume 132.9 mL     Right kidney  Normal echogenicity and contour.  No mass is identified.  No hydronephrosis.  No shadowing calculi.  No perinephric fluid collections.     Left kidney  Normal echogenicity and contour.  No mass is identified.  No hydronephrosis.  No shadowing calculi.  No perinephric fluid collections.     URETERS:  Nonvisualized.     BLADDER:  Marginally distended.  No focal thickening or mass lesions.  Bilateral ureteral jets detected.     Prostate measures 3.8 x 4.4 x 4.2 cm (volume 37 mL)     IMPRESSION:     Prostamegaly.    PROCEDURE:     SEE NOTE

## 2024-04-05 NOTE — PROGRESS NOTES
Cystoscopy     Date/Time  4/5/2024 3:00 PM     Performed by  Kali Dempsey MD   Authorized by  Kali Dempsey MD     Universal Protocol:  Timeout called at: 4/5/2024 3:11 PM.      Procedure Details:  Procedure type: cystoscopy    Patient tolerance: Patient tolerated the procedure well with no immediate complications    Additional Procedure Details:   Cystoscopy procedure note:  Risk and benefits of flexible cystoscopy were discussed. Informed consent was obtained. Urine dip was adequate for cystoscopy.  The patient was placed in the supine position. His genitalia was prepped with Betadine and draped in a sterile fashion. Viscous 2% lidocaine jelly was instilled into the urethra and allowed dwell time for local anesthesia.  Flexible cystoscopy was then performed using a 16F scope. The distal urethra and prostatic urethra were evaluated and were normal in course and caliber. Once inside the bladder, it was carefully inspected in a 360 degree fashion. There was no evidence of mucosal abnormalities, lesions, diverticula or stones. Both ureteral orifices in their orthotopic location were visualized with clear efflux of urine. Retroflexion for evaluation of the bladder neck was normal.  Overall this was a negative cystoscopy.

## 2024-05-09 ENCOUNTER — TELEPHONE (OUTPATIENT)
Dept: FAMILY MEDICINE CLINIC | Facility: CLINIC | Age: 39
End: 2024-05-09

## 2024-05-29 ENCOUNTER — EVALUATION (OUTPATIENT)
Dept: PHYSICAL THERAPY | Facility: CLINIC | Age: 39
End: 2024-05-29
Payer: COMMERCIAL

## 2024-05-29 DIAGNOSIS — N50.819 PAIN IN TESTICLE, UNSPECIFIED LATERALITY: ICD-10-CM

## 2024-05-29 DIAGNOSIS — R10.2 PELVIC PAIN: ICD-10-CM

## 2024-05-29 DIAGNOSIS — M62.89 PELVIC FLOOR DYSFUNCTION: Primary | ICD-10-CM

## 2024-05-29 PROCEDURE — 97530 THERAPEUTIC ACTIVITIES: CPT | Performed by: PHYSICAL THERAPIST

## 2024-05-29 PROCEDURE — 97162 PT EVAL MOD COMPLEX 30 MIN: CPT | Performed by: PHYSICAL THERAPIST

## 2024-05-29 NOTE — PROGRESS NOTES
PT Evaluation     Today's date: 2024  Patient name: Zheng Garzon  : 1985  MRN: [id number]  Referring provider: Kali Dempsey, Ada  Dx:   Encounter Diagnosis     ICD-10-CM    1. Pelvic floor dysfunction  M62.89       2. Pelvic pain  R10.2       3. Pain in testicle, unspecified laterality  N50.819                      Assessment  Impairments: activity intolerance, lacks appropriate home exercise program, pain with function and activity limitations  Symptom irritability: moderate  Understanding of Dx/Px/POC: excellent     Prognosis: excellent    Goals  (2 sessions - as per pt's request)  1. Independent and safe with PF HEP.  2. Independent and safe with abdominal breathing/quieting tech.  3. Independent and safe with PF lengthening/self stretching tech to fully relax PFM.  4. Independent and safe with body mechanics and PFM activation.    Plan  Patient would benefit from: skilled physical therapy  Planned modality interventions: biofeedback    Planned therapy interventions: manual therapy, neuromuscular re-education, patient/caregiver education, postural training, strengthening, stretching, therapeutic training, therapeutic activities, therapeutic exercise, home exercise program, graded exercise, flexibility, breathing training, body mechanics training, behavior modification, abdominal trunk stabilization and activity modification    Frequency: 1x week  Duration in weeks: 2  Treatment plan discussed with: patient        PT Pelvic Floor Subjective:   History of Present Illness:   Pt is 37 y/o male with Hx of pelvic pain, weak urinary stream, pain with ejaculation and radiating pain into his testicles and scrotum. Pt was seen by urologist and was referred to [facility] PT for possible increased tone in PFM.  Current functional limitations: overall improvement in PFM quieting/relaxation with breathing, already some improvement in symptoms. At this time pt is c/o weak stream with urination.Date of onset:  "1/1/2024          Not a recurrent problem   Quality of life: good    Social Support:     Lives in:  Multiple-level home    Lives with:  Alone    Relationship status: never     Work status: employed full time (prolong standing on job)    Life stress level: 4    Life stress severity: moderate    History of Depression: noPronouns: he/him  Hand dominance:  Ambidextrous  Diet and Exercise:    Diet:balanced nutrition    Exercise type: biking, weight training, strengthening exercise program, combination activity and yoga    Exercise frequency: 2-3 times per week  Bladder Function:     Voiding Difficulties positive for: urgency, frequent urination and straining      Voiding Difficulties negative for: incomplete emptying       Voiding Difficulties comments:     Voiding frequency: every 3-4 hours    Urinary leakage: urine leakage (post void dribble)    Urinary leakage aggravated by: post-void dribble (getting better)    Nocturia (episodes per night): 0    Painful urination: No (post urination in the past)      Fluid Intake Type:  Water, coffee and alcohol    Intake (ounces): Water intake (oz): 36 oz. Juice intake (oz): cramberry 8-16 oz. Coffee intake (oz): 1 cup. Soda intake (oz): 2-3 can per week. Alcohol intake (oz): on/off 6 pack.  Incontinence Management:     Pads/Diaper Use:  None  Bowel Function:     Voiding DIfficulties: unfinished feeling after defecating      Bowel frequency: daily and multiple times a day    [place] Stool Scale: type 3 and type 4    Stool softener use: no stool softeners    Enema use: no enema    Uses \"squatty potty\": no Squatty Potty  Sexual Function:     Sexually Active:  Sexually activeSexual function: ejaculation pain (80-90% better)  Pain:     No pain reported by patient.  Diagnostic Tests:     Ultrasound: normal  Treatments:     Previous treatment:  Medication  Patient Goals:     Patient goals for therapy:  Improved quality of life, relaxation, improved comfort, improved pain " "management and decreased pain      Objective       Abdominal Assessment:      Position: supine exam  Abdominal Assessment:  Abdominal findings with curl up: supine knees felxed.    Diastatis   Diastasis recti present: yes  3\" above umbilicus (# fingers): 3  Umbilicus (# fingers): 2  3\" below umbilicus (# fingers): 0  Connective tissue integrity at linea alba: firm  able to engage transverse abdominis     Skin inspection:   no scars present.       General Perineum Exam:   Positive for hemorrhoids.     Visual Inspection of Perineum:   Excursion of perineal body in cephalad direction with contraction of pelvic floor muscles (PFM): fair  and unable to isolate without substitution  Excursion of perineal body in caudal direction with relaxation of pelvic floor muscles (PFM): good   Involuntary contraction with coughing: no  Involuntary relaxation with bearing down: no  Cotton swab test: non-tender  Cough reflex: cough reflex  Sphincter Tone Resting: normal  Sphincter Tone Squeeze: normal  Sensation: intact  Tenderness: unprovoked    Pelvic Organ Prolapse   no pelvic organ prolapse  Perineal body inspection: within normal limits        Pelvic Floor Muscle Exam:     Muscle Contraction: substitution   Breathing pattern with contraction: holding breath   Pelvic floor muscle relaxation is complete.         PERFECT Score   Power right: 2+/5   Power left: 2+/5   Endurance (seconds to max): 3   Repetitions (before fatigue): 3   Fast flicks (in 10 seconds): 3      Rectal Pelvic Floor Muscle Exam    External anal sphincter: wink reflex intact and hemorrhoids    pelvic floor exam consent given by patient    Pelvic exam completed: rectally                Precautions: not any given      Manuals 5/29            PF MMT 2+/5 w decreased prolong hold, normal resting tone            DEBBIE 3/2/0                                      Neuro Re-Ed                                                                                                      "   Ther Ex             Sitting abdominal breathing demo            Sitting w towel roll PF/TA demo            Sitting w towel roll PF lengthening demo                         Child pose stretch             P[place]ca stretch             Happy baby stretch w lengthening                          Ther Activity             HEP demo/edu/review Given 8'                         Gait Training                                       Modalities

## 2024-06-05 ENCOUNTER — OFFICE VISIT (OUTPATIENT)
Dept: PHYSICAL THERAPY | Facility: CLINIC | Age: 39
End: 2024-06-05
Payer: COMMERCIAL

## 2024-06-05 DIAGNOSIS — N50.819 PAIN IN TESTICLE, UNSPECIFIED LATERALITY: ICD-10-CM

## 2024-06-05 DIAGNOSIS — M62.89 PELVIC FLOOR DYSFUNCTION: Primary | ICD-10-CM

## 2024-06-05 DIAGNOSIS — R10.2 PELVIC PAIN: ICD-10-CM

## 2024-06-05 PROCEDURE — 97110 THERAPEUTIC EXERCISES: CPT | Performed by: PHYSICAL THERAPIST

## 2024-06-05 PROCEDURE — 97112 NEUROMUSCULAR REEDUCATION: CPT | Performed by: PHYSICAL THERAPIST

## 2024-06-05 NOTE — PROGRESS NOTES
"Daily Note     Today's date: 2024  Patient name: Zheng Garzon  : 1985  MRN: [id number]  Referring provider: Kali Dempsey, *  Dx:   Encounter Diagnosis     ICD-10-CM    1. Pelvic floor dysfunction  M62.89       2. Pain in testicle, unspecified laterality  N50.819       3. Pelvic pain  R10.2                      Subjective: Pt reports performing HEP daily. Some generalized lower abdominal soreness verbalized post HEP.      Objective: See treatment diary below      Assessment: Tolerated treatment well. Patient exhibited good technique with therapeutic exercises. HEP was updated and reviewed. No discomfort post tx voiced.       Plan: Self D/C with HEP update/review.     Precautions: not any given      Manuals            PF MMT 2+/5 w decreased prolong hold, normal resting tone            DEBBIE 3/2/0                                      Neuro Re-Ed                          Rec bike/posture/PF  L1 10'                                                                            Ther Ex             Sitting abdominal breathing demo reviewed           Sitting w towel roll PF/TA demo reveiwed           Sitting w towel roll PF lengthening demo reveiwed           Cat cow/PF  5x5\" VC/sTC's           Child pose stretch  3x30\" VC's/TC's           Abie stretch  2x30\" ea side VC's/TC's           Happy baby stretch w lengthening  3x30\"           Supine with PF lengthening  5x5\" Vc's/TC's           Butterfly stretch  3x30\"                                     Ther Activity             HEP demo/edu/review Given 8' Given/updated/reviewed 8'                        Gait Training                                       Modalities                                            "